# Patient Record
Sex: MALE | Race: WHITE | URBAN - METROPOLITAN AREA
[De-identification: names, ages, dates, MRNs, and addresses within clinical notes are randomized per-mention and may not be internally consistent; named-entity substitution may affect disease eponyms.]

---

## 2017-08-10 ENCOUNTER — OFFICE VISIT (OUTPATIENT)
Dept: URGENT CARE | Facility: URGENT CARE | Age: 22
End: 2017-08-10
Payer: OTHER MISCELLANEOUS

## 2017-08-10 VITALS
TEMPERATURE: 98.5 F | WEIGHT: 225 LBS | SYSTOLIC BLOOD PRESSURE: 121 MMHG | OXYGEN SATURATION: 100 % | HEART RATE: 62 BPM | DIASTOLIC BLOOD PRESSURE: 73 MMHG

## 2017-08-10 DIAGNOSIS — S00.33XA CONTUSION OF NOSE, INITIAL ENCOUNTER: Primary | ICD-10-CM

## 2017-08-10 PROCEDURE — 99202 OFFICE O/P NEW SF 15 MIN: CPT | Performed by: PHYSICIAN ASSISTANT

## 2017-08-10 ASSESSMENT — PAIN SCALES - GENERAL: PAINLEVEL: MODERATE PAIN (4)

## 2017-08-10 NOTE — LETTER
[facility]  [address]  [facility] [address]  Phone: [phone]    August 10, 2017        Boom Jessica  [address] [id number]          To whom it may concern:    RE: Boom Jessica    Patient was seen and treated today at our clinic.  He may return to work without restrictions.     Please contact me for questions or concerns.      Sincerely,        Dara Frank PA-C

## 2017-08-10 NOTE — NURSING NOTE
"Initial /73 (BP Location: Left arm, Patient Position: Chair)  Pulse 62  Temp 98.5  F (36.9  C) (Oral)  Wt 225 lb (102.1 kg)  SpO2 100% Estimated body mass index is 23.99 kg/(m^2) as calculated from the following:    Height as of 7/5/13: 6' 1\" (1.854 m).    Weight as of 7/5/13: 181 lb 12.8 oz (82.5 kg). .    "

## 2017-08-10 NOTE — PROGRESS NOTES
SUBJECTIVE:                                                    Boom Jessica is a 21 year old male who presents to clinic today for the following health issues:      Musculoskeletal problem/pain      Duration: work comp injury approximately 10:30am 8/10/17    Description  Location: nose    Intensity:  mild    Accompanying signs and symptoms: none    History  Previous similar problem: no   Previous evaluation:  none    Precipitating or alleviating factors:  Trauma or overuse: YES- pt loading  and mower fell on his face  Aggravating factors include: painful when touched    Therapies tried and outcome: nothing    Nose is tender to the touch   No cut, no nosebleed   He remembers the whole thing  No headache  No history of broken nose.   No vision changes      Problem list and histories reviewed & adjusted, as indicated.  Additional history: as documented    Patient Active Problem List   Diagnosis     Common wart     Scapular dyskinesis     Pain in shoulder     Pain in joint, shoulder region     No past surgical history on file.    Social History   Substance Use Topics     Smoking status: Never Smoker     Smokeless tobacco: Never Used     Alcohol use No     No family history on file.      Current Outpatient Prescriptions   Medication Sig Dispense Refill     mesalamine (ASACOL HD) 800 MG EC tablet Take 800 mg by mouth. 6 tablets daily       clindamycin-benzoyl peroxide (BENZACLIN) gel Apply  topically At Bedtime. 50 g 1     triamcinolone (KENALOG) 0.1 % ointment Apply  topically. Apply to the affected areas twice daily for up to 2 weeks at a time. 60 g 3     Allergies   Allergen Reactions     Penicillins Hives     Amoxicillin Hives         Reviewed and updated as needed this visit by clinical staffTobacco  Allergies  Meds       Reviewed and updated as needed this visit by Provider         ROS:  Constitutional, HEENT, cardiovascular, pulmonary, gi and gu systems are negative, except as otherwise  noted.      OBJECTIVE:   /73 (BP Location: Left arm, Patient Position: Chair)  Pulse 62  Temp 98.5  F (36.9  C) (Oral)  Wt 225 lb (102.1 kg)  SpO2 100%  There is no height or weight on file to calculate BMI.  GENERAL: healthy, alert and no distress  EYES: Eyes grossly normal to inspection, PERRL and conjunctivae and sclerae normal  HENT: normal cephalic, oropharynx clear, oral mucous membranes moist and   Mild redness on bridge of nose, minimal tenderness over red area but so surrounding tenderness, nasal septum appears midline, so septal swelling or erythema, patient can breath through the nose without problems.   NEURO: Normal strength and tone, mentation intact and speech normal  PSYCH: mentation appears normal, affect normal/bright    Diagnostic Test Results:  none     ASSESSMENT/PLAN:     1. Contusion of nose, initial encounter  I reassured the patient his nose does not appear broken and I do not see a deformity.  He was provided with a work note.  No need to follow up unless pain is getting worse or he develops nasal symptoms.     Dara Frank PA-C  [facility]

## 2017-08-10 NOTE — MR AVS SNAPSHOT
"              After Visit Summary   8/10/2017    Boom Jessica    MRN: [id number]           Patient Information     Date Of Birth          1995        Visit Information        Provider Department      8/10/2017 12:05 PM Dara Frank PA-C [facility]        Today's Diagnoses     Contusion of nose, initial encounter    -  1       Follow-ups after your visit        Who to contact     If you have questions or need follow up information about today's clinic visit or your schedule please contact [facility] directly at [phone].  Normal or non-critical lab and imaging results will be communicated to you by [employer], letter or phone within 4 business days after the clinic has received the results. If you do not hear from us within 7 days, please contact the clinic through [employer] or phone. If you have a critical or abnormal lab result, we will notify you by phone as soon as possible.  Submit refill requests through [employer] or call your pharmacy and they will forward the refill request to us. Please allow 3 business days for your refill to be completed.          Additional Information About Your Visit        MyChart Information     Netsize lets you send messages to your doctor, view your test results, renew your prescriptions, schedule appointments and more. To sign up, go to [email or web address] . Click on \"Log in\" on the left side of the screen, which will take you to the Welcome page. Then click on \"Sign up Now\" on the right side of the page.     You will be asked to enter the access code listed below, as well as some personal information. Please follow the directions to create your username and password.     Your access code is: [email or web address]  Expires: 2017 12:25 AM     Your access code will  in 90 days. If you need help or a new code, please call your [facility] or [phone].        Care EveryWhere ID     This is your Care EveryWhere " ID. This could be used by other organizations to access your [facility] medical records  [id number]        Your Vitals Were     Pulse Temperature Pulse Oximetry             62 98.5  F (36.9  C) (Oral) 100%          Blood Pressure from Last 3 Encounters:   08/10/17 121/73   07/05/13 108/66   03/14/13 128/67    Weight from Last 3 Encounters:   08/10/17 225 lb (102.1 kg) (97 %)*   07/05/13 181 lb 12.8 oz (82.5 kg) (95 %)*   03/14/13 192 lb (87.1 kg) (97 %)*     * Growth percentiles are based on [facility] 2-20 Years data.              Today, you had the following     No orders found for display       Primary Care Provider    None Specified       No primary provider on file.        Equal Access to Services     CRISTINA SILVERMAN : Lopez Mullen, kalina villegas, javon kaalmacele gutierrez, lev celis . So [facility] [phone].    ATENCIÓN: Si habla español, tiene a thapa disposición servicios gratuitos de asistencia lingüística. Llame al [phone].    We comply with applicable federal civil rights laws and [place] laws. We do not discriminate on the basis of race, color, national origin, age, disability sex, sexual orientation or gender identity.            Thank you!     Thank you for choosing [facility]  for your care. Our goal is always to provide you with excellent care. Hearing back from our patients is one way we can continue to improve our services. Please take a few minutes to complete the written survey that you may receive in the mail after your visit with us. Thank you!             Your Updated Medication List - Protect others around you: Learn how to safely use, store and throw away your medicines at www.disposemymeds.org.          This list is accurate as of: 8/10/17 11:59 PM.  Always use your most recent med list.                   Brand Name Dispense Instructions for use Diagnosis    ASACOL  MG EC tablet   Generic drug:  mesalamine      Take 800 mg  by mouth. 6 tablets daily        clindamycin-benzoyl peroxide gel    BENZACLIN    50 g    Apply  topically At Bedtime.    Acne vulgaris       triamcinolone 0.1 % ointment    KENALOG    60 g    Apply  topically. Apply to the affected areas twice daily for up to 2 weeks at a time.    Dermatitis

## 2021-04-26 ENCOUNTER — TELEPHONE (OUTPATIENT)
Dept: GASTROENTEROLOGY | Facility: CLINIC | Age: 26
End: 2021-04-26

## 2021-04-26 NOTE — TELEPHONE ENCOUNTER
[facility] Call Center    Phone Message    May a detailed message be left on voicemail: yes     Reason for Call: Mom called to schedule the patient for Gastroenterology.  The patient had a J- Pouch placed 7 years ago at the [facility] and is having issues with the pouch and Ulcerative Colitis.  Advised the Care Team would review and call with scheduling options.  Thank you.     Action Taken: Message routed to:  Adult Clinics: Gastroenterology (GI) p [id number]    Travel Screening: Not Applicable

## 2021-04-27 NOTE — TELEPHONE ENCOUNTER
LPN spoke with patients mom, Angelina and assisted with scheduling appointment per message below. Patients mom stated that patient was seen in [facility] on 4/26/21 and was diagnosed with pouchitis and started on an antibiotic. LPN received records from [facility] through Care Everywhere. LPN also requested permission from patients mom to pull records for [place] and was given a verbal authorization.     Omar Conrad MD  You; Sonia Smith DO; [facility] 2 hours ago (6:27 AM)     I can add this patient May 11th for VV, just make 11:30am slot.       Yamile Rodriguez LPN

## 2021-05-11 ENCOUNTER — VIRTUAL VISIT (OUTPATIENT)
Dept: GASTROENTEROLOGY | Facility: CLINIC | Age: 26
End: 2021-05-11
Payer: COMMERCIAL

## 2021-05-11 DIAGNOSIS — K91.850 POUCHITIS (H): Primary | ICD-10-CM

## 2021-05-11 DIAGNOSIS — Z87.19 HISTORY OF ULCERATIVE COLITIS: ICD-10-CM

## 2021-05-11 PROCEDURE — 99203 OFFICE O/P NEW LOW 30 MIN: CPT | Mod: GT | Performed by: INTERNAL MEDICINE

## 2021-05-11 RX ORDER — CIPROFLOXACIN 500 MG/1
TABLET, FILM COATED ORAL
COMMUNITY
Start: 2021-04-26

## 2021-05-11 NOTE — PATIENT INSTRUCTIONS
Mando Nur,    It was nice to talk with you today.  As we discussed, I do not think you need any other antibiotic therapy at this point.  Please continue to monitor for symptom recurrence.  If you are getting recurrent episodes of pouchitis, then we would want to do some stool studies to ensure there is no infection and possibly look in the pouch with a scope to take some biopsy samples.  If you are feeling well though, there is nothing else you need to do at this point.  Lets touch base again in about 6 months in order to ensure you are doing well.  Please let me know if you have any questions or concerns in the meantime.    Omar Conrad MD

## 2021-05-11 NOTE — PROGRESS NOTES
Boom is a 25 year old who is being evaluated via a billable video visit.      How would you like to obtain your AVS? MyChart  If the video visit is dropped, the invitation should be resent by: Text to cell phone: [phone]   Will anyone else be joining your video visit? No      Video Start Time:   Video-Visit Details    Type of service:  Video Visit    Video End Time:    Originating Location (pt. Location):     Distant Location (provider location):  [facility]     Platform used for Video Visit:   Levi Servin CMA

## 2021-05-19 ENCOUNTER — TELEPHONE (OUTPATIENT)
Dept: GASTROENTEROLOGY | Facility: CLINIC | Age: 26
End: 2021-05-19

## 2021-05-19 NOTE — TELEPHONE ENCOUNTER
Mom returned call, states that they went to [facility] ED as nausea and vomiting worsened and he could not keep anything down.  C-Diff was ruled out, they provided IV fluids and patient is feeling better.  Mom reports they will prescribe anti-nausea medication and a probiotic.  Patient will sign a release to provide details of ER visit to Dr. Conrad (in case visit is not available in Care Everywhere).      Myranda Salgado RN

## 2021-05-19 NOTE — TELEPHONE ENCOUNTER
[facility] Call Center    Phone Message    May a detailed message be left on voicemail: yes     Reason for Call: Medication Refill Request    Has the patient contacted the pharmacy for the refill? Yes   Name of medication being requested: ciprofloxacin (CIPRO) 500 MG   Provider who prescribed the medication: Levi Servin MA  Pharmacy: [id number] [id number] AT [facility]  [address]  Date medication is needed: 05/19/21 patient is out         Action Taken: Message routed to:  Adult Clinics: Gastroenterology (GI) p [id number]    Travel Screening: Not Applicable

## 2021-05-19 NOTE — TELEPHONE ENCOUNTER
A/o, respirations are even and unlabored on room air , assessment completed, vital signs stable, SR  on the monitor, IV fluids running per orders, pt denies any dizziness and nausea at the moment, updated communication board,  poc discussed and understood, verbalized understanding, box meal given, all questions answered at this time , fall precautions in place, call button within reach, will continue to monitor   Spoke with Mom, Angelina, with verbal consent from patient.  Patient was feeling better after the course of Cipro was completed.  Last night patient developed loose, watery stools and nausea/vomiting.  Patient reports emesis x 3 since 7 AM, vomited again while speaking with mother.  Patient denies blood in stool or emesis.  Patient does report eating poorly since symptoms had improved.      Reviewed s/s to seek care in the ER - suspect dehydration (dry mouth, decreased skin turgor, lack of urination), dizziness, weakness or uncontrolled n/v or pain.      Recommended frequent sips of water, gatorade or ginger ale and bland foods as tolerated to manage symptoms at home.  Advised will request further advice from provider and see if stool tests are recommended.      Mom verbalizes understanding, agrees to plan.    Myranda Salgado RN

## 2021-05-19 NOTE — TELEPHONE ENCOUNTER
Attempted to reach mother and patient for update, left messages for return call.     Myranda Salgado RN

## 2021-05-20 ENCOUNTER — TELEPHONE (OUTPATIENT)
Dept: GASTROENTEROLOGY | Facility: CLINIC | Age: 26
End: 2021-05-20

## 2021-05-20 DIAGNOSIS — K91.850 POUCHITIS (H): Primary | ICD-10-CM

## 2021-05-20 RX ORDER — CIPROFLOXACIN 500 MG/1
500 TABLET, FILM COATED ORAL 2 TIMES DAILY
Qty: 42 TABLET | Refills: 0 | Status: SHIPPED | OUTPATIENT
Start: 2021-05-20 | End: 2021-06-10

## 2021-05-20 NOTE — TELEPHONE ENCOUNTER
Patient returned call, states he got a notification that a prescription was sent in from Dr. Conrad.    Reviewed the following with patient:    Omar Conrad MD  You 33 minutes ago (3:35 PM)     Ok to use metamucil 1 scoop per day and imodium     He should try continued conservative therapy for a few more days.  If he is worsening over the weekend, then I send a prescription for Cipro 500mg BID x 21 days to his pharmacy which he can start.  He should let us know if he needs to start this medication.   Thanks,   Omar Conrad MD      Patient advised may try Imodium, but to be cautious to avoid constipation.  Patient verbalizes understanding of information, requested that patient update us on symptoms and if antibiotic is started.  Patient agrees.    Myranda Salgado RN

## 2021-05-20 NOTE — TELEPHONE ENCOUNTER
Patient contacted clinic with update after ER visit yesterday.  Patient continues to have loose, watery stool.  Reports 5-6 stools so far today.  Reviewed the following per Dr. Conrad:    Omar Conrad MD  You 21 hours ago (3:49 PM)     Reviewed.     Usually pouchitis does not cause nausea/vomiting.  This may be infectious gastroenteritis which does not need ABX.  Suggest that they do the conservative therapy recommended by the ED including probiotic and zofran.  If symptoms failing to improve after 2-3 more days then call back and we can consider ABX at that point.  Omar Conrad MD      Patient continues to push fluids.  Has not experienced any further emesis, but does experience occasional lightheadedness.  Reviewed the importance of fluids, including pedialyte or gatorade.      Patient did not go to work today due to continued loose stools and not feeling 100%.  Reports that he ran a marathon 5/9/2021.  Completed Cipro course 2 days prior.  Prior to the marathon patient was consuming a mostly meat based diet.  Following the marathon he ate whatever he wanted and admits that it was not very healthy.  Resumed the meat based diet and then experienced the watery stools, nausea and vomiting shortly thereafter.      Patient agrees to return call if not self-resolving.  Will check with provider to advise if fiber or imodium would be recommended.      Patient provided verbal authorization to collect records from [facility] from Care Everywhere.      Myranda Salgado RN

## 2021-06-06 ENCOUNTER — HEALTH MAINTENANCE LETTER (OUTPATIENT)
Age: 26
End: 2021-06-06

## 2021-08-12 ENCOUNTER — MYC MEDICAL ADVICE (OUTPATIENT)
Dept: CARDIOLOGY | Facility: CLINIC | Age: 26
End: 2021-08-12

## 2021-09-13 NOTE — TELEPHONE ENCOUNTER
2nd attempt.    MyChart unread.    Left voicemail to schedule 6 mo follow up with Dr. Conrad for around 11/11/21.    Kimberli Bethea  Medical Specialty Procedure   [facility] [place]

## 2021-09-20 NOTE — TELEPHONE ENCOUNTER
3rd attempt.    Chart letter printed & sent.    Kimberli Bethea  Medical Specialty Procedure   [facility] [place]

## 2021-09-26 ENCOUNTER — HEALTH MAINTENANCE LETTER (OUTPATIENT)
Age: 26
End: 2021-09-26

## 2022-07-03 ENCOUNTER — HEALTH MAINTENANCE LETTER (OUTPATIENT)
Age: 27
End: 2022-07-03

## 2023-01-08 ENCOUNTER — HEALTH MAINTENANCE LETTER (OUTPATIENT)
Age: 28
End: 2023-01-08

## 2023-07-16 ENCOUNTER — HEALTH MAINTENANCE LETTER (OUTPATIENT)
Age: 28
End: 2023-07-16